# Patient Record
(demographics unavailable — no encounter records)

---

## 2018-08-28 NOTE — MMO
BILATERAL SCREENING MAMMOGRAM: 

 

Date:  08/28/18 

 

HISTORY:  

Screening.

 

COMPARISON:  

Mammograms from 2017, 2016, and 2015. 

 

TECHNIQUE:  

Bilateral screening CC and MLO, as well as implant displaced mammograms. 

 

This patient's mammogram was interpreted with the assistance of computer-aided detection.  

 

FINDINGS:

There are scattered fibroglandular densities. There is increased focal asymmetry behind the left nipp
le. There is also an asymmetry outer left breast seen on CC only, not well defined on the MLO mammogr
am. 

 

Right breast is unremarkable. 

 

IMPRESSION: 

 

BIRADS 0:  Incomplete:  Need Additional Imaging Evaluation and/or Prior Mammograms for Comparison

 

Increasing focal asymmetry retroareolar left breast behind the nipple requires further evaluation. Sp
ot compression and ultrasound, if deemed necessary, is recommended. 

 

Asymmetry left breast outer one-half posterior depth seen only on the CC mammogram. Recommend spot co
mpression and ultrasound, if deemed necessary. 

 

The facility will notify patient of need for additional imaging services. 

 

 

POS: [facility]